# Patient Record
Sex: FEMALE
[De-identification: names, ages, dates, MRNs, and addresses within clinical notes are randomized per-mention and may not be internally consistent; named-entity substitution may affect disease eponyms.]

---

## 2023-08-28 ENCOUNTER — DASHBOARD ENCOUNTERS (OUTPATIENT)
Age: 73
End: 2023-08-28

## 2023-08-28 ENCOUNTER — OFFICE VISIT (OUTPATIENT)
Dept: URBAN - METROPOLITAN AREA CLINIC 82 | Facility: CLINIC | Age: 73
End: 2023-08-28
Payer: MEDICARE

## 2023-08-28 VITALS
TEMPERATURE: 97 F | SYSTOLIC BLOOD PRESSURE: 138 MMHG | WEIGHT: 209.4 LBS | HEART RATE: 103 BPM | DIASTOLIC BLOOD PRESSURE: 81 MMHG | HEIGHT: 63 IN | BODY MASS INDEX: 37.1 KG/M2

## 2023-08-28 DIAGNOSIS — R10.84 GENERALIZED ABDOMINAL PAIN: ICD-10-CM

## 2023-08-28 DIAGNOSIS — R63.4 WEIGHT LOSS: ICD-10-CM

## 2023-08-28 DIAGNOSIS — R14.0 ABDOMINAL BLOATING: ICD-10-CM

## 2023-08-28 DIAGNOSIS — R14.2 BURPING: ICD-10-CM

## 2023-08-28 PROBLEM — 102614006: Status: ACTIVE | Noted: 2023-08-28

## 2023-08-28 PROBLEM — 271834000: Status: ACTIVE | Noted: 2023-08-28

## 2023-08-28 PROBLEM — 89362005: Status: ACTIVE | Noted: 2023-08-28

## 2023-08-28 PROBLEM — 248490000: Status: ACTIVE | Noted: 2023-08-28

## 2023-08-28 PROCEDURE — 99242 OFF/OP CONSLTJ NEW/EST SF 20: CPT | Performed by: INTERNAL MEDICINE

## 2023-08-28 PROCEDURE — 99202 OFFICE O/P NEW SF 15 MIN: CPT | Performed by: INTERNAL MEDICINE

## 2023-08-28 NOTE — PHYSICAL EXAM GASTROINTESTINAL
Abdomen , soft, EPIGASTRIC tender, nondistended , no guarding or rigidity , no masses palpable , normal bowel sounds , Liver and Spleen , no hepatomegaly present , no hepatosplenomegaly , liver nontender , spleen not palpable

## 2023-08-28 NOTE — HPI-TODAY'S VISIT:
WAS IN HOSPITAL FOR 10DAYS, [facility] FOR PNEUMONIA,   OTHER ISSUE , UTI, CELLLUTIS, THRUSH IN MOUTH  GASTRO PAIN, DULCOLAX , DID NOT HELP  ANAYA CARRINGTON GIVES HISTORY  PANTOPRAZOLE NOT HELP   GAS POCKTS IN LLQ AND GOES TO BACK AND SHOULDER, AND BURPING.   STOMACH FEELS TIGHT. FEELS LIKE WAIST BEND  NEVER HAD INTESTINES BLOCKAGE  BM GOOD, DULUAX, ONCE A WEEK. PRUNES JUICES HELP'  LOST 10 LBS  VERY LITTLE PASSING GAS  LAST COLONSOCPY 3-4 YEARS AGO, AND IT WAS NEGATIVE  CANT EAT, FOOD SIT IN CHEST AND WONT GO DOWN.  WAS THROWING UP IN PAST BUT NOT RECENTLY

## 2023-08-29 ENCOUNTER — TELEPHONE ENCOUNTER (OUTPATIENT)
Dept: URBAN - NONMETROPOLITAN AREA CLINIC 2 | Facility: CLINIC | Age: 73
End: 2023-08-29

## 2023-08-30 ENCOUNTER — OFFICE VISIT (OUTPATIENT)
Dept: URBAN - METROPOLITAN AREA CLINIC 82 | Facility: CLINIC | Age: 73
End: 2023-08-30